# Patient Record
(demographics unavailable — no encounter records)

---

## 2024-11-03 NOTE — PLAN
[FreeTextEntry1] : Patient advised to keep an eye on cholesterol and blood sugar levels, as elevated on recent labs. Patient to return in six months for a CPE.

## 2024-11-03 NOTE — HISTORY OF PRESENT ILLNESS
[FreeTextEntry1] : 6 month follow up [de-identified] : Mr. SHERIE CARMEN is a 61-year male who is following up on HLD and prediabetes. He sees urologist for management of T injections and had labs done by him and he has a copy for my review. He has no acute concerns, feels well and recently returned from vacation where he was able to go hiking. He would like refill of medications. He would like flu vaccine.

## 2024-11-03 NOTE — HISTORY OF PRESENT ILLNESS
[FreeTextEntry1] : 6 month follow up [de-identified] : Mr. SHERIE CARMEN is a 61-year male who is following up on HLD and prediabetes. He sees urologist for management of T injections and had labs done by him and he has a copy for my review. He has no acute concerns, feels well and recently returned from vacation where he was able to go hiking. He would like refill of medications. He would like flu vaccine.

## 2025-03-20 NOTE — PHYSICAL EXAM
[de-identified] : GENERAL APPEARANCE: Well nourished and hydrated, pleasant, alert, and oriented x 3. Appears their stated age. HEENT: Normocephalic, extraocular eye motion intact. Nasal septum midline. Oral cavity clear. External auditory canal clear. RESPIRATORY: Breath sounds clear and audible in all lobes. No wheezing, No accessory muscle use. CARDIOVASCULAR: No apparent abnormalities. No lower leg edema. No varicosities. Pedal pulses are palpable. NEUROLOGIC: Sensation is normal, no muscle weakness in the upper or lower extremities. DERMATOLOGIC: No apparent skin lesions, moist, warm, no rash. SPINE: Cervical spine appears normal and moves freely; thoracic spine appears normal and moves freely; lumbosacral spine appears normal and moves freely, normal, nontender. MUSCULOSKELETAL: Hands, wrists, and elbows are normal and move freely, shoulders are normal and move freely. PSYCHIATRIC: Oriented to person, place, and time, insight and judgement were intact and the affect was normal. DTRs: Biceps, brachioradialis, triceps, patellar, ankle and plantar 2+ and symmetric bilaterally. Pulses: dorsalis pedis, posterior tibial, femoral, popliteal, and radial 2+ and symmetric bilaterally. Constitutional: Alert and in no acute distress, but well-appearing. [de-identified] : right knee exam shows mild effusion, ROM is 0 to 120 degrees, no instability, no pain with Yesika, medial joint line tenderness. 5/5 motor strength in bilateral lower extremities. Sensory: Intact in bilateral lower extremities.  [de-identified] : 4 views of the right knee obtained the office today show no acute fracture or dislocation.  There is moderate to severe medial joint space narrowing small osteophyte formation consistent with Kellgren-Gurpreet grade 2 3 changes

## 2025-03-20 NOTE — PROCEDURE
[de-identified] : I injected the patient's right knee today with cortisone for primary osteoarthritis.  I discussed at length with the patient the planned steroid and lidocaine injection. The risks, benefits, convalescence and alternatives were reviewed. The possible side effects discussed included but were not limited to: pain, swelling, heat, bleeding, and redness. Symptoms are generally mild but if they are extensive then contact the office. Giving pain relievers by mouth such as NSAIDs or Tylenol can generally treat the reactions to steroid and lidocaine. Rare cases of infection have been noted. Rash, hives and itching may occur post injection. If you have muscle pain or cramps, flushing and or swelling of the face, rapid heart beat, nausea, dizziness, fever, chills, headache, difficulty breathing, swelling in the arms or legs, or have a prickly feeling of your skin, contact a health care provider immediately. Following this discussion, the knee was prepped with Alcohol and under sterile condition the 80 mg Depo-Medrol and 6 cc Lidocaine injection was performed with a 20 gauge needle through a superolateral injection site. The needle was introduced into the joint, aspiration was performed to ensure intra-articular placement and the medication was injected. Upon withdrawal of the needle the site was cleaned with alcohol and a band aid applied. The patient tolerated the injection well and there were no adverse effects. Post injection instructions included no strenuous activity for 24 hours, cryotherapy and if there are any adverse effects to contact the office.

## 2025-03-20 NOTE — DISCUSSION/SUMMARY
[Medication Risks Reviewed] : Medication risks reviewed [Surgical risks reviewed] : Surgical risks reviewed [de-identified] : Patient is a 60-year-old male with right knee osteoarthritis most pronounced in the medial compartment presenting today for initial evaluation.  He is not a try conservative treatment I think is warranted this time.  Given injection of cortisone in the right knee which he tolerated well.  We discussed low-impact activity exercise.  I recommended physical therapy.  I given prescription meloxicam 15 mg daily as needed for pain.  I will see him back in 6 weeks for repeat evaluation possible MRI if he continues to have pain.  All questions were asked and answered

## 2025-03-20 NOTE — HISTORY OF PRESENT ILLNESS
[de-identified] : This is a 62 year old M pt presents today for right knee pain started last week was playing softball and by the 3rd day he couldn't stretch or lift his leg and if he walks there is increased pain. He was taking ibuprofen everyday. he has no clicking or popping sounds, he has not tried prior injections or PT. he is not using a cane or walker. no clicking or popping sounds.  no constitutional signs or symptoms   march 2016 had surgery for torn meniscus (right knee arthroscopy)

## 2025-03-20 NOTE — ADDENDUM
[FreeTextEntry1] : This note was written by Marques Cox, acting as the  for Dr. Trevizo on 03/20/2025. This note accurately reflects the work and decisions made by Dr. Trevizo.

## 2025-03-26 NOTE — RESULTS/DATA
[] : results reviewed [de-identified] : within normal limits [de-identified] : unremarkable [de-identified] : normal  [de-identified] : see above for result.

## 2025-03-26 NOTE — HISTORY OF PRESENT ILLNESS
[No Pertinent Cardiac History] : no history of aortic stenosis, atrial fibrillation, coronary artery disease, recent myocardial infarction, or implantable device/pacemaker [Asthma] : asthma [No Adverse Anesthesia Reaction] : no adverse anesthesia reaction in self or family member [(Patient denies any chest pain, claudication, dyspnea on exertion, orthopnea, palpitations or syncope)] : Patient denies any chest pain, claudication, dyspnea on exertion, orthopnea, palpitations or syncope [Good (7-10 METs)] : Good (7-10 METs) [COPD] : no COPD [Sleep Apnea] : no sleep apnea [Smoker] : not a smoker [Chronic Anticoagulation] : no chronic anticoagulation [Chronic Kidney Disease] : no chronic kidney disease [Diabetes] : no diabetes [FreeTextEntry1] : [facility]  [FreeTextEntry2] : 03/31/2025 [FreeTextEntry3] : Dr. Marquez  [FreeTextEntry4] : Patient is a 62-year male with a PMH of BPH, low Testosterone, HLD, Ashma, Prediabetes presents for preoperative medical clearance for [facility]. Patient reports feeling well. HR is slightly elevated due to having coffee a short while before coming in for appointment. He has no acute complaints.  [FreeTextEntry7] : EKG: Sinus rhythm with occasional PVC, otherwise normal ECG with rate 68 BPM.

## 2025-05-14 NOTE — HISTORY OF PRESENT ILLNESS
[FreeTextEntry1] : 6 month follow up [de-identified] : Patient is following up for medication management. He had labs done by [facility] and brought a copy for my review. A1c at 6%. BP is well controlled.  He would like to request that I prescribe his testosterone refills. He has been on it for many years.

## 2025-05-14 NOTE — HISTORY OF PRESENT ILLNESS
[FreeTextEntry1] : 6 month follow up [de-identified] : Patient is following up for medication management. He had labs done by [facility] and brought a copy for my review. A1c at 6%. BP is well controlled.  He would like to request that I prescribe his testosterone refills. He has been on it for many years.

## 2025-05-14 NOTE — PLAN
[FreeTextEntry1] : Advised patient to call me with name of exact testosterone product and dosing and would refill for two months, since he will be going to usual office for this month's supply.  Plan is for follow up every 3 months for Testosterone Refills, other medications will remain the same. Labs will be done every 3 months prior to appointment.

## 2025-07-29 NOTE — DISCUSSION/SUMMARY
[Medication Risks Reviewed] : Medication risks reviewed [Surgical risks reviewed] : Surgical risks reviewed [de-identified] : Patient is a 60-year-old male with right knee osteoarthritis most pronounced in the medial compartment presenting today for f/u evaluation.  He has a previous history of right knee arthroscopy in 2016.  He experienced temper improvement pain following a cortisone injection in March 2025.His MRI does show evidence of full-thickness articular cartilage loss in the medial compartment.  We have thorough discussion about conservative surgical treatment options.  We did discuss a unicompartmental versus total knee arthroplasty.  He would like to think about this.  He did offer cortisone injection in the right knee which he tolerated well.  We discussed low-impact activity and excise.  Continue with physical therapy.  I will see him back in 6 weeks for repeat evaluation.  All questions were asked and answered

## 2025-07-29 NOTE — PROCEDURE
[de-identified] : I injected his right knee. I discussed at length with the patient the planned steroid and lidocaine injection. The risks, benefits, convalescence and alternatives were reviewed. The possible side effects discussed included but were not limited to: pain, swelling, heat, bleeding, and redness. Symptoms are generally mild but if they are extensive then contact the office. Giving pain relievers by mouth such as NSAIDs or Tylenol can generally treat the reactions to steroid and lidocaine. Rare cases of infection have been noted. Rash, hives and itching may occur post injection. If you have muscle pain or cramps, flushing and or swelling of the face, rapid heart beat, nausea, dizziness, fever, chills, headache, difficulty breathing, swelling in the arms or legs, or have a prickly feeling of your skin, contact a health care provider immediately. Following this discussion, the knee was prepped with Alcohol and under sterile condition the 80 mg Depo-Medrol and 6 cc Lidocaine injection was performed with a 20 gauge needle through a superolateral injection site. The needle was introduced into the joint, aspiration was performed to ensure intra-articular placement and the medication was injected. Upon withdrawal of the needle the site was cleaned with alcohol and a band aid applied. The patient tolerated the injection well and there were no adverse effects. Post injection instructions included no strenuous activity for 24 hours, cryotherapy and if there are any adverse effects to contact the office.

## 2025-07-29 NOTE — HISTORY OF PRESENT ILLNESS
[de-identified] : This is a 62 year old M pt presents today for f/u of right knee pain. He was given a cortisone injection on 3/20/2025.  He experienced temporary improvement from this injection.  He sustained a twisting type incident while playing golf several weeks ago and states that his pain has returned.  He was referred for an MRI of the knee he does present today to review the results of the study.  He continues report intermittent, mild to moderate dull and occasional sharp twinges of discomfort localized to the medial aspect of knee.  Exacerbated with prolonged standing, walking and deep knee bending.  Currently denies any complaints of instability. march 2016 had surgery for torn meniscus (right knee arthroscopy)

## 2025-07-29 NOTE — HISTORY OF PRESENT ILLNESS
[de-identified] : This is a 62 year old M pt presents today for f/u of right knee pain. He was given a cortisone injection on 3/20/2025.  He experienced temporary improvement from this injection.  He sustained a twisting type incident while playing golf several weeks ago and states that his pain has returned.  He was referred for an MRI of the knee he does present today to review the results of the study.  He continues report intermittent, mild to moderate dull and occasional sharp twinges of discomfort localized to the medial aspect of knee.  Exacerbated with prolonged standing, walking and deep knee bending.  Currently denies any complaints of instability. march 2016 had surgery for torn meniscus (right knee arthroscopy)

## 2025-07-29 NOTE — PHYSICAL EXAM
[de-identified] : GENERAL APPEARANCE: Well nourished and hydrated, pleasant, alert, and oriented x 3. Appears their stated age. HEENT: Normocephalic, extraocular eye motion intact. Nasal septum midline. Oral cavity clear. External auditory canal clear. RESPIRATORY: Breath sounds clear and audible in all lobes. No wheezing, No accessory muscle use. CARDIOVASCULAR: No apparent abnormalities. No lower leg edema. No varicosities. Pedal pulses are palpable. NEUROLOGIC: Sensation is normal, no muscle weakness in the upper or lower extremities. DERMATOLOGIC: No apparent skin lesions, moist, warm, no rash. SPINE: Cervical spine appears normal and moves freely; thoracic spine appears normal and moves freely; lumbosacral spine appears normal and moves freely, normal, nontender. MUSCULOSKELETAL: Hands, wrists, and elbows are normal and move freely, shoulders are normal and move freely. PSYCHIATRIC: Oriented to person, place, and time, insight and judgement were intact and the affect was normal. DTRs: Biceps, brachioradialis, triceps, patellar, ankle and plantar 2+ and symmetric bilaterally. Pulses: dorsalis pedis, posterior tibial, femoral, popliteal, and radial 2+ and symmetric bilaterally. Constitutional: Alert and in no acute distress, but well-appearing. [de-identified] : right knee exam shows mild effusion, ROM is 0 to 120 degrees, no instability, Medial joint line tenderness, mild pain to the medial joint line with Yesika's 5/5 motor strength in bilateral lower extremities. Sensory: Intact in bilateral lower extremities.  [de-identified] : We did review the patient's noncontrast MRI of the right knee performed on 5/20/2025.  The study does show evidence of postsurgical changes in the posterior horn of the medial meniscus.  There is some degenerative tearing of the remnant posterior horn.  There is evidence of full-thickness articular cartilage loss involving the outer two thirds of the weightbearing aspect of the medial femoral condyle and outer three fourths of the weightbearing aspect of the medial tibial plateau.  There is also evidence of bone marrow edema at the outer aspect of the medial tibial plateau with locking likely stress reaction without evidence of stress fracture. Mild lateral compartment osteoarthritis.  No significant arthritic change of the patellofemoral compartment. No evidence of acute ligamentous injury.

## 2025-07-29 NOTE — PHYSICAL EXAM
[de-identified] : GENERAL APPEARANCE: Well nourished and hydrated, pleasant, alert, and oriented x 3. Appears their stated age. HEENT: Normocephalic, extraocular eye motion intact. Nasal septum midline. Oral cavity clear. External auditory canal clear. RESPIRATORY: Breath sounds clear and audible in all lobes. No wheezing, No accessory muscle use. CARDIOVASCULAR: No apparent abnormalities. No lower leg edema. No varicosities. Pedal pulses are palpable. NEUROLOGIC: Sensation is normal, no muscle weakness in the upper or lower extremities. DERMATOLOGIC: No apparent skin lesions, moist, warm, no rash. SPINE: Cervical spine appears normal and moves freely; thoracic spine appears normal and moves freely; lumbosacral spine appears normal and moves freely, normal, nontender. MUSCULOSKELETAL: Hands, wrists, and elbows are normal and move freely, shoulders are normal and move freely. PSYCHIATRIC: Oriented to person, place, and time, insight and judgement were intact and the affect was normal. DTRs: Biceps, brachioradialis, triceps, patellar, ankle and plantar 2+ and symmetric bilaterally. Pulses: dorsalis pedis, posterior tibial, femoral, popliteal, and radial 2+ and symmetric bilaterally. Constitutional: Alert and in no acute distress, but well-appearing. [de-identified] : right knee exam shows mild effusion, ROM is 0 to 120 degrees, no instability, Medial joint line tenderness, mild pain to the medial joint line with Yesika's 5/5 motor strength in bilateral lower extremities. Sensory: Intact in bilateral lower extremities.  [de-identified] : We did review the patient's noncontrast MRI of the right knee performed on 5/20/2025.  The study does show evidence of postsurgical changes in the posterior horn of the medial meniscus.  There is some degenerative tearing of the remnant posterior horn.  There is evidence of full-thickness articular cartilage loss involving the outer two thirds of the weightbearing aspect of the medial femoral condyle and outer three fourths of the weightbearing aspect of the medial tibial plateau.  There is also evidence of bone marrow edema at the outer aspect of the medial tibial plateau with locking likely stress reaction without evidence of stress fracture. Mild lateral compartment osteoarthritis.  No significant arthritic change of the patellofemoral compartment. No evidence of acute ligamentous injury.

## 2025-07-29 NOTE — PROCEDURE
[de-identified] : I injected his right knee. I discussed at length with the patient the planned steroid and lidocaine injection. The risks, benefits, convalescence and alternatives were reviewed. The possible side effects discussed included but were not limited to: pain, swelling, heat, bleeding, and redness. Symptoms are generally mild but if they are extensive then contact the office. Giving pain relievers by mouth such as NSAIDs or Tylenol can generally treat the reactions to steroid and lidocaine. Rare cases of infection have been noted. Rash, hives and itching may occur post injection. If you have muscle pain or cramps, flushing and or swelling of the face, rapid heart beat, nausea, dizziness, fever, chills, headache, difficulty breathing, swelling in the arms or legs, or have a prickly feeling of your skin, contact a health care provider immediately. Following this discussion, the knee was prepped with Alcohol and under sterile condition the 80 mg Depo-Medrol and 6 cc Lidocaine injection was performed with a 20 gauge needle through a superolateral injection site. The needle was introduced into the joint, aspiration was performed to ensure intra-articular placement and the medication was injected. Upon withdrawal of the needle the site was cleaned with alcohol and a band aid applied. The patient tolerated the injection well and there were no adverse effects. Post injection instructions included no strenuous activity for 24 hours, cryotherapy and if there are any adverse effects to contact the office.

## 2025-07-29 NOTE — DISCUSSION/SUMMARY
[Medication Risks Reviewed] : Medication risks reviewed [Surgical risks reviewed] : Surgical risks reviewed [de-identified] : Patient is a 60-year-old male with right knee osteoarthritis most pronounced in the medial compartment presenting today for f/u evaluation.  He has a previous history of right knee arthroscopy in 2016.  He experienced temper improvement pain following a cortisone injection in March 2025.His MRI does show evidence of full-thickness articular cartilage loss in the medial compartment.  We have thorough discussion about conservative surgical treatment options.  We did discuss a unicompartmental versus total knee arthroplasty.  He would like to think about this.  He did offer cortisone injection in the right knee which he tolerated well.  We discussed low-impact activity and excise.  Continue with physical therapy.  I will see him back in 6 weeks for repeat evaluation.  All questions were asked and answered